# Patient Record
Sex: MALE
[De-identification: names, ages, dates, MRNs, and addresses within clinical notes are randomized per-mention and may not be internally consistent; named-entity substitution may affect disease eponyms.]

---

## 2019-06-05 ENCOUNTER — APPOINTMENT (OUTPATIENT)
Dept: ENDOCRINOLOGY | Facility: CLINIC | Age: 84
End: 2019-06-05
Payer: MEDICARE

## 2019-06-05 VITALS
DIASTOLIC BLOOD PRESSURE: 73 MMHG | HEIGHT: 66 IN | BODY MASS INDEX: 25.23 KG/M2 | HEART RATE: 97 BPM | SYSTOLIC BLOOD PRESSURE: 149 MMHG | WEIGHT: 157 LBS

## 2019-06-05 DIAGNOSIS — Z00.00 ENCOUNTER FOR GENERAL ADULT MEDICAL EXAMINATION W/OUT ABNORMAL FINDINGS: ICD-10-CM

## 2019-06-05 LAB
GLUCOSE BLDC GLUCOMTR-MCNC: 192
HBA1C MFR BLD HPLC: 8.6

## 2019-06-05 PROCEDURE — 36415 COLL VENOUS BLD VENIPUNCTURE: CPT

## 2019-06-05 PROCEDURE — 83036 HEMOGLOBIN GLYCOSYLATED A1C: CPT | Mod: QW

## 2019-06-05 PROCEDURE — 99205 OFFICE O/P NEW HI 60 MIN: CPT | Mod: 25

## 2019-06-05 PROCEDURE — 82962 GLUCOSE BLOOD TEST: CPT

## 2019-06-05 RX ORDER — FENOFIBRATE 150 MG/1
150 CAPSULE ORAL
Refills: 0 | Status: ACTIVE | COMMUNITY

## 2019-06-05 RX ORDER — LOSARTAN POTASSIUM 100 MG/1
100 TABLET, FILM COATED ORAL
Refills: 0 | Status: ACTIVE | COMMUNITY

## 2019-06-05 RX ORDER — GLIMEPIRIDE 2 MG/1
2 TABLET ORAL TWICE DAILY
Refills: 0 | Status: ACTIVE | COMMUNITY

## 2019-06-05 RX ORDER — FUROSEMIDE 20 MG/1
20 TABLET ORAL
Refills: 0 | Status: ACTIVE | COMMUNITY

## 2019-06-05 RX ORDER — SITAGLIPTIN 100 MG/1
100 TABLET, FILM COATED ORAL
Refills: 0 | Status: ACTIVE | COMMUNITY

## 2019-06-05 RX ORDER — TERAZOSIN 5 MG/1
5 CAPSULE ORAL
Refills: 0 | Status: ACTIVE | COMMUNITY

## 2019-06-07 RX ORDER — BLOOD SUGAR DIAGNOSTIC
STRIP MISCELLANEOUS
Qty: 100 | Refills: 0 | Status: ACTIVE | COMMUNITY
Start: 2018-04-24

## 2019-06-07 RX ORDER — PIOGLITAZONE HYDROCHLORIDE 45 MG/1
45 TABLET ORAL
Qty: 30 | Refills: 0 | Status: DISCONTINUED | COMMUNITY
Start: 2018-08-08

## 2019-06-07 RX ORDER — FENOFIBRATE 145 MG/1
145 TABLET, COATED ORAL
Qty: 90 | Refills: 0 | Status: DISCONTINUED | COMMUNITY
Start: 2018-12-28

## 2019-06-07 RX ORDER — SITAGLIPTIN 50 MG/1
50 TABLET, FILM COATED ORAL
Qty: 30 | Refills: 0 | Status: DISCONTINUED | COMMUNITY
Start: 2018-08-08

## 2019-06-07 RX ORDER — ATORVASTATIN CALCIUM 40 MG/1
40 TABLET, FILM COATED ORAL
Qty: 90 | Refills: 0 | Status: ACTIVE | COMMUNITY
Start: 2019-03-29

## 2019-06-07 NOTE — ASSESSMENT
[FreeTextEntry1] : 1) DM2: Uncontrolled, A1C on 10/2017 at 10%. taget of <7%. Natural hx of the disease and importance of treatment targets discussed at length, she verbalized understanding. ADA diet and importance of exercise discussed at length. Plan is to potentially restart metformin if eGFR is >45 and introduce GLP-1 agonist after we Reduce glimepiride to 4 mg AC Brk and 2 mg AC dinner. titration instructions provided. Refer to Nutritionist today. We miguel angel check microalbumin, lipids and labs on the NV. Discuss vaccines and podiatry/opthalmology referrals on NV\par \par 2) Weight gain:  complicated by DM2. Discussed medical  strategies. Pt would like to try lifestyle modifications and GLP-1 agonist therapy in the future. Reassess on the NV for at least ~5% TBW loss.\par \par 3) Essential HTN: Pt is not at goal BP and on an Arb. Reassess microalbumin prior to the NV.\par  \par 4) Dyslipidemia: Pt is on a moderate intensity statin. Atorvastatin 40 mg QDaily. REassess lipids on the next visit. LDL target <100.\par \par 5) Pancreatic mass\par r/o neuroendocrine tumor w/ screening cGa. Unclear etiology behind spells but advise to monitor BP/HR and FSG when they occur. No s/s of pellagra or carcinoid apparent. He will bring collateral info from [facility] on the NV.\par  [Long Term Vascular Complications] : long term vascular complications of diabetes [Carbohydrate Consistent Diet] : carbohydrate consistent diet [Incretin Mimetic Therapy] : Risks and benefits of incretin mimetic therapy were discussed with the patient including nauseau, pancreatitis and potential risk of medullary thyroid cancer [Importance of Diet and Exercise] : importance of diet and exercise to improve glycemic control, achieve weight loss and improve cardiovascular health

## 2019-06-07 NOTE — HISTORY OF PRESENT ILLNESS
[FreeTextEntry1] : 82 y/o M w/ Hx of DM2, HTN, HLD and recenlty diagnosed pancreatic mass currently undergoing w/u at [facility].\par recently stopped actos 45 mg PO QD 2/2 b/l LE swealling, past tolerance w/ metformin.\par dx w/ dm2 in 1990, has never been on insulin. over the last year it has become more difficult to control. Med rec shows high dose SFU use. He monitors FSG at home and reports no FSG lower than 80. 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 he otherwise denies any f/c, CP, SOB, palpitations, tremors, depressed mood, anxiety, palpitations, n/v, stool/urinary abn, skin/weight changes, heat/cold intolerance, HAs, breast/nipple changes, polyuria/polydipsia/nocturia or other complaints.\par

## 2019-06-07 NOTE — PHYSICAL EXAM
[Alert] : alert [No Acute Distress] : no acute distress [Well Developed] : well developed [Well Nourished] : well nourished [Normal Sclera/Conjunctiva] : normal sclera/conjunctiva [EOMI] : extra ocular movement intact [No Proptosis] : no proptosis [Thyroid Not Enlarged] : the thyroid was not enlarged [Normal Oropharynx] : the oropharynx was normal [No Thyroid Nodules] : there were no palpable thyroid nodules [No Accessory Muscle Use] : no accessory muscle use [No Respiratory Distress] : no respiratory distress [Clear to Auscultation] : lungs were clear to auscultation bilaterally [Normal Rate] : heart rate was normal  [Regular Rhythm] : with a regular rhythm [Pedal Pulses Normal] : the pedal pulses are present [Normal S1, S2] : normal S1 and S2 [No Edema] : there was no peripheral edema [Normal Bowel Sounds] : normal bowel sounds [Soft] : abdomen soft [Not Tender] : non-tender [Not Distended] : not distended [No HSM] : no hepato-splenomegaly [Post Cervical Nodes] : posterior cervical nodes [Anterior Cervical Nodes] : anterior cervical nodes [No Masses] : no abdominal mass palpated [Normal] : normal and non tender [Axillary Nodes] : axillary nodes [Spine Straight] : spine straight [No Spinal Tenderness] : no spinal tenderness [Normal Gait] : normal gait [No Stigmata of Cushings Syndrome] : no stigmata of cushings syndrome [Acanthosis Nigricans] : no acanthosis nigricans [Normal Strength/Tone] : muscle strength and tone were normal [No Rash] : no rash [No Tremors] : no tremors [Normal Reflexes] : deep tendon reflexes were 2+ and symmetric [Oriented x3] : oriented to person, place, and time [de-identified] : ventral hernia, reducible and NT

## 2019-06-14 ENCOUNTER — RX RENEWAL (OUTPATIENT)
Age: 84
End: 2019-06-14

## 2019-06-14 LAB
ALBUMIN SERPL ELPH-MCNC: 4.2 G/DL
ALP BLD-CCNC: 62 U/L
ALT SERPL-CCNC: 12 U/L
ANION GAP SERPL CALC-SCNC: 15 MMOL/L
AST SERPL-CCNC: 18 U/L
BILIRUB SERPL-MCNC: 0.4 MG/DL
BUN SERPL-MCNC: 22 MG/DL
C PEPTIDE SERPL-MCNC: 8.1 NG/ML
CALCIUM SERPL-MCNC: 9.5 MG/DL
CGA SERPL-MCNC: 232 NG/ML
CHLORIDE SERPL-SCNC: 105 MMOL/L
CHOLEST SERPL-MCNC: 154 MG/DL
CHOLEST/HDLC SERPL: 3.9 RATIO
CO2 SERPL-SCNC: 22 MMOL/L
CREAT SERPL-MCNC: 1.79 MG/DL
CREAT SPEC-SCNC: 212 MG/DL
FOLATE SERPL-MCNC: 15.8 NG/ML
GLUCOSE SERPL-MCNC: 166 MG/DL
HDLC SERPL-MCNC: 40 MG/DL
LDLC SERPL CALC-MCNC: 85 MG/DL
MICROALBUMIN 24H UR DL<=1MG/L-MCNC: 14.4 MG/DL
MICROALBUMIN/CREAT 24H UR-RTO: 68 MG/G
POTASSIUM SERPL-SCNC: 4.5 MMOL/L
PROT SERPL-MCNC: 6.6 G/DL
SODIUM SERPL-SCNC: 142 MMOL/L
T4 FREE SERPL-MCNC: 1.5 NG/DL
TRIGL SERPL-MCNC: 145 MG/DL
TSH SERPL-ACNC: 1.64 UIU/ML
VIT B12 SERPL-MCNC: 496 PG/ML

## 2019-06-14 RX ORDER — METFORMIN ER 750 MG 750 MG/1
750 TABLET ORAL DAILY
Qty: 30 | Refills: 3 | Status: ACTIVE | COMMUNITY
Start: 2019-06-14 | End: 1900-01-01

## 2019-07-16 ENCOUNTER — APPOINTMENT (OUTPATIENT)
Dept: ENDOCRINOLOGY | Facility: CLINIC | Age: 84
End: 2019-07-16
Payer: MEDICARE

## 2019-07-16 VITALS
SYSTOLIC BLOOD PRESSURE: 164 MMHG | HEART RATE: 66 BPM | DIASTOLIC BLOOD PRESSURE: 65 MMHG | WEIGHT: 157 LBS | BODY MASS INDEX: 25.34 KG/M2

## 2019-07-16 DIAGNOSIS — E11.8 TYPE 2 DIABETES MELLITUS WITH UNSPECIFIED COMPLICATIONS: ICD-10-CM

## 2019-07-16 DIAGNOSIS — I10 ESSENTIAL (PRIMARY) HYPERTENSION: ICD-10-CM

## 2019-07-16 DIAGNOSIS — Q45.3 OTHER CONGENITAL MALFORMATIONS OF PANCREAS AND PANCREATIC DUCT: ICD-10-CM

## 2019-07-16 DIAGNOSIS — E78.5 HYPERLIPIDEMIA, UNSPECIFIED: ICD-10-CM

## 2019-07-16 DIAGNOSIS — E11.65 TYPE 2 DIABETES MELLITUS WITH UNSPECIFIED COMPLICATIONS: ICD-10-CM

## 2019-07-16 LAB — GLUCOSE BLDC GLUCOMTR-MCNC: 195

## 2019-07-16 PROCEDURE — 82962 GLUCOSE BLOOD TEST: CPT

## 2019-07-16 PROCEDURE — 99214 OFFICE O/P EST MOD 30 MIN: CPT | Mod: 25

## 2019-07-19 PROBLEM — Q45.3 PANCREATIC ABNORMALITY: Status: ACTIVE | Noted: 2019-06-05

## 2019-07-19 PROBLEM — E11.8 UNCONTROLLED TYPE 2 DIABETES MELLITUS WITH COMPLICATION: Status: ACTIVE | Noted: 2019-06-05

## 2019-07-19 PROBLEM — I10 BENIGN ESSENTIAL HTN: Status: ACTIVE | Noted: 2019-06-07

## 2019-07-19 PROBLEM — E78.5 DYSLIPIDEMIA: Status: ACTIVE | Noted: 2019-06-07

## 2019-07-19 NOTE — HISTORY OF PRESENT ILLNESS
[FreeTextEntry1] : 84 y/o M w/ Hx of DM2, HTN, HLD and recenlty diagnosed pancreatic mass currently undergoing w/u at [facility].\par recently stopped actos 45 mg PO QD 2/2 b/l LE swealling, past tolerance w/ metformin.\par dx w/ dm2 in 1990, has never been on insulin. over the last year it has become more difficult to control. Med rec shows high dose SFU use. He monitors FSG at home and reports no FSG lower than 80. 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 \par \par 7/2019: Here for /fu, generally feels well and endorses no acute complaints. No interval events since LV. Today reports adherence to present meds, does endorse low blood sugars in early AM, FSG ~70 and symptomatic.\par he otherwise denies any f/c, CP, SOB, palpitations, tremors, depressed mood, anxiety, palpitations, n/v, stool/urinary abn, skin/weight changes, heat/cold intolerance, HAs, breast/nipple changes, polyuria/polydipsia/nocturia or other complaints.\par

## 2019-07-19 NOTE — ASSESSMENT
[Carbohydrate Consistent Diet] : carbohydrate consistent diet [Long Term Vascular Complications] : long term vascular complications of diabetes [Importance of Diet and Exercise] : importance of diet and exercise to improve glycemic control, achieve weight loss and improve cardiovascular health [Incretin Mimetic Therapy] : Risks and benefits of incretin mimetic therapy were discussed with the patient including nauseau, pancreatitis and potential risk of medullary thyroid cancer [FreeTextEntry1] : Instructions for Mr. Hernandez:\par 1)	Continue one Metformin tablet of 750 mg once in the morning\par 2)	Continue one Januvia tablet of 100 mg once in the morning\par 3)	Continue taking one Glimepiride tablet of 2 mg before breakfast and before lunch. Stop taking Glimepiride before dinner.\par 4)	Call Dr. Dillard at  if you continue to have frequent low blood sugar episodes in the early morning.\par \par \par \par 1) DM2: improving control per FSG, A1C on 10/2017 at 10%. taget of <7%. Natural hx of the disease and importance of treatment targets discussed at length, she verbalized understanding. ADA diet and importance of exercise discussed at length. Plan is to potentially restart metformin if eGFR is >45 and introduce GLP-1 agonist after we Reduce glimepiride to 2 mg AC Brk and 2 mg AC Lunch. titration instructions provided. Refer to Nutritionist today. We miguel angel check microalbumin, lipids and labs on the NV. Discuss vaccines and podiatry/opthalmology referrals on NV\par \par 2) Weight gain:  complicated by DM2. Discussed medical  strategies. Pt would like to try lifestyle modifications and GLP-1 agonist therapy in the future. Reassess on the NV for at least ~5% TBW loss.\par \par 3) Essential HTN: Pt is not at goal BP and on an Arb. Reassess microalbumin prior to the NV.\par  \par 4) Dyslipidemia: Pt is on a moderate intensity statin. Atorvastatin 40 mg QDaily. REassess lipids on the next visit. LDL target <100.\par \par 5) Pancreatic mass\par cGa elevated in the setting of high SFU use, no s/s of NET. Unclear etiology behind spells but advise to monitor BP/HR and FSG when they occur. No s/s of pellagra or carcinoid apparent. He will bring collateral info from [facility] on the NV. repeat once off of SFU.\par

## 2019-07-19 NOTE — PHYSICAL EXAM
[Alert] : alert [No Acute Distress] : no acute distress [Well Nourished] : well nourished [Well Developed] : well developed [Normal Sclera/Conjunctiva] : normal sclera/conjunctiva [EOMI] : extra ocular movement intact [No Proptosis] : no proptosis [Normal Oropharynx] : the oropharynx was normal [Thyroid Not Enlarged] : the thyroid was not enlarged [No Thyroid Nodules] : there were no palpable thyroid nodules [No Respiratory Distress] : no respiratory distress [No Accessory Muscle Use] : no accessory muscle use [Clear to Auscultation] : lungs were clear to auscultation bilaterally [Normal Rate] : heart rate was normal  [Normal S1, S2] : normal S1 and S2 [Regular Rhythm] : with a regular rhythm [Pedal Pulses Normal] : the pedal pulses are present [No Edema] : there was no peripheral edema [Normal Bowel Sounds] : normal bowel sounds [Not Tender] : non-tender [Soft] : abdomen soft [Not Distended] : not distended [No HSM] : no hepato-splenomegaly [No Masses] : no abdominal mass palpated [Post Cervical Nodes] : posterior cervical nodes [Anterior Cervical Nodes] : anterior cervical nodes [Axillary Nodes] : axillary nodes [Normal] : normal and non tender [No Spinal Tenderness] : no spinal tenderness [Spine Straight] : spine straight [No Stigmata of Cushings Syndrome] : no stigmata of cushings syndrome [Normal Gait] : normal gait [Normal Strength/Tone] : muscle strength and tone were normal [No Rash] : no rash [Normal Reflexes] : deep tendon reflexes were 2+ and symmetric [No Tremors] : no tremors [Oriented x3] : oriented to person, place, and time [Acanthosis Nigricans] : no acanthosis nigricans [de-identified] : ventral hernia, reducible and NT

## 2019-09-10 ENCOUNTER — APPOINTMENT (OUTPATIENT)
Dept: ENDOCRINOLOGY | Facility: CLINIC | Age: 84
End: 2019-09-10